# Patient Record
Sex: MALE | Race: WHITE | Employment: OTHER | URBAN - METROPOLITAN AREA
[De-identification: names, ages, dates, MRNs, and addresses within clinical notes are randomized per-mention and may not be internally consistent; named-entity substitution may affect disease eponyms.]

---

## 2018-10-23 PROCEDURE — 36415 COLL VENOUS BLD VENIPUNCTURE: CPT | Performed by: INTERNAL MEDICINE

## 2018-10-23 PROCEDURE — 86803 HEPATITIS C AB TEST: CPT | Performed by: INTERNAL MEDICINE

## 2019-05-30 PROBLEM — F41.9 ANXIETY: Status: ACTIVE | Noted: 2019-05-30

## 2020-04-28 PROBLEM — N18.30 CHRONIC RENAL IMPAIRMENT, STAGE 3 (MODERATE) (HCC): Status: ACTIVE | Noted: 2020-04-28

## 2022-05-25 ENCOUNTER — LAB ENCOUNTER (OUTPATIENT)
Dept: LAB | Age: 72
End: 2022-05-25
Attending: SURGERY
Payer: MEDICARE

## 2022-05-25 ENCOUNTER — NURSE ONLY (OUTPATIENT)
Dept: LAB | Age: 72
End: 2022-05-25
Payer: MEDICARE

## 2022-05-25 ENCOUNTER — ANESTHESIA EVENT (OUTPATIENT)
Dept: SURGERY | Facility: HOSPITAL | Age: 72
End: 2022-05-25
Payer: MEDICARE

## 2022-05-25 DIAGNOSIS — Z01.812 ENCOUNTER FOR PREOPERATIVE SCREENING LABORATORY TESTING FOR COVID-19 VIRUS: ICD-10-CM

## 2022-05-25 DIAGNOSIS — Z20.822 ENCOUNTER FOR PREOPERATIVE SCREENING LABORATORY TESTING FOR COVID-19 VIRUS: ICD-10-CM

## 2022-05-25 DIAGNOSIS — E21.3 HYPERPARATHYROIDISM (HCC): ICD-10-CM

## 2022-05-25 LAB
CHLORIDE SERPL-SCNC: 108 MMOL/L (ref 98–112)
CO2 SERPL-SCNC: 30 MMOL/L (ref 21–32)
POTASSIUM SERPL-SCNC: 4.8 MMOL/L (ref 3.5–5.1)
SARS-COV-2 RNA RESP QL NAA+PROBE: NOT DETECTED
SODIUM SERPL-SCNC: 139 MMOL/L (ref 136–145)

## 2022-05-25 PROCEDURE — 93005 ELECTROCARDIOGRAM TRACING: CPT

## 2022-05-25 PROCEDURE — 80051 ELECTROLYTE PANEL: CPT

## 2022-05-25 PROCEDURE — 36415 COLL VENOUS BLD VENIPUNCTURE: CPT

## 2022-05-27 ENCOUNTER — ANESTHESIA (OUTPATIENT)
Dept: SURGERY | Facility: HOSPITAL | Age: 72
End: 2022-05-27
Payer: MEDICARE

## 2022-05-27 ENCOUNTER — HOSPITAL ENCOUNTER (OUTPATIENT)
Facility: HOSPITAL | Age: 72
Setting detail: HOSPITAL OUTPATIENT SURGERY
Discharge: HOME | End: 2022-05-27
Attending: SURGERY | Admitting: SURGERY
Payer: MEDICARE

## 2022-05-27 VITALS
HEIGHT: 72 IN | BODY MASS INDEX: 25.6 KG/M2 | HEART RATE: 58 BPM | DIASTOLIC BLOOD PRESSURE: 60 MMHG | RESPIRATION RATE: 16 BRPM | OXYGEN SATURATION: 94 % | SYSTOLIC BLOOD PRESSURE: 115 MMHG | WEIGHT: 189 LBS | TEMPERATURE: 98 F

## 2022-05-27 DIAGNOSIS — Z01.812 ENCOUNTER FOR PREOPERATIVE SCREENING LABORATORY TESTING FOR COVID-19 VIRUS: Primary | ICD-10-CM

## 2022-05-27 DIAGNOSIS — E21.3 HYPERPARATHYROIDISM (HCC): ICD-10-CM

## 2022-05-27 DIAGNOSIS — Z20.822 ENCOUNTER FOR PREOPERATIVE SCREENING LABORATORY TESTING FOR COVID-19 VIRUS: Primary | ICD-10-CM

## 2022-05-27 LAB
INR BLD: 1.07 (ref 0.8–1.2)
PROTHROMBIN TIME: 13.9 SECONDS (ref 11.6–14.8)
PTH-INTACT SERPL-MCNC: 103.2 PG/ML
PTH-INTACT SERPL-MCNC: 135.5 PG/ML
PTH-INTACT SERPL-MCNC: 24.7 PG/ML

## 2022-05-27 PROCEDURE — 85610 PROTHROMBIN TIME: CPT

## 2022-05-27 PROCEDURE — BG43ZZZ ULTRASONOGRAPHY OF PARATHYROID GLANDS: ICD-10-PCS | Performed by: SURGERY

## 2022-05-27 PROCEDURE — 83970 ASSAY OF PARATHORMONE: CPT | Performed by: SURGERY

## 2022-05-27 PROCEDURE — 88305 TISSUE EXAM BY PATHOLOGIST: CPT | Performed by: SURGERY

## 2022-05-27 PROCEDURE — 88331 PATH CONSLTJ SURG 1 BLK 1SPC: CPT | Performed by: SURGERY

## 2022-05-27 PROCEDURE — 0GBL0ZZ EXCISION OF RIGHT SUPERIOR PARATHYROID GLAND, OPEN APPROACH: ICD-10-PCS | Performed by: SURGERY

## 2022-05-27 RX ORDER — DEXAMETHASONE SODIUM PHOSPHATE 4 MG/ML
VIAL (ML) INJECTION AS NEEDED
Status: DISCONTINUED | OUTPATIENT
Start: 2022-05-27 | End: 2022-05-27 | Stop reason: SURG

## 2022-05-27 RX ORDER — ROCURONIUM BROMIDE 10 MG/ML
INJECTION, SOLUTION INTRAVENOUS AS NEEDED
Status: DISCONTINUED | OUTPATIENT
Start: 2022-05-27 | End: 2022-05-27 | Stop reason: SURG

## 2022-05-27 RX ORDER — SODIUM CHLORIDE, SODIUM LACTATE, POTASSIUM CHLORIDE, CALCIUM CHLORIDE 600; 310; 30; 20 MG/100ML; MG/100ML; MG/100ML; MG/100ML
INJECTION, SOLUTION INTRAVENOUS CONTINUOUS
Status: DISCONTINUED | OUTPATIENT
Start: 2022-05-27 | End: 2022-05-27

## 2022-05-27 RX ORDER — LIDOCAINE HYDROCHLORIDE 10 MG/ML
INJECTION, SOLUTION EPIDURAL; INFILTRATION; INTRACAUDAL; PERINEURAL AS NEEDED
Status: DISCONTINUED | OUTPATIENT
Start: 2022-05-27 | End: 2022-05-27 | Stop reason: SURG

## 2022-05-27 RX ORDER — ONDANSETRON 2 MG/ML
4 INJECTION INTRAMUSCULAR; INTRAVENOUS EVERY 6 HOURS PRN
Status: DISCONTINUED | OUTPATIENT
Start: 2022-05-27 | End: 2022-05-27

## 2022-05-27 RX ORDER — NALOXONE HYDROCHLORIDE 0.4 MG/ML
80 INJECTION, SOLUTION INTRAMUSCULAR; INTRAVENOUS; SUBCUTANEOUS AS NEEDED
Status: DISCONTINUED | OUTPATIENT
Start: 2022-05-27 | End: 2022-05-27

## 2022-05-27 RX ORDER — PHENYLEPHRINE HCL 10 MG/ML
VIAL (ML) INJECTION AS NEEDED
Status: DISCONTINUED | OUTPATIENT
Start: 2022-05-27 | End: 2022-05-27 | Stop reason: SURG

## 2022-05-27 RX ORDER — METOCLOPRAMIDE HYDROCHLORIDE 5 MG/ML
10 INJECTION INTRAMUSCULAR; INTRAVENOUS EVERY 8 HOURS PRN
Status: DISCONTINUED | OUTPATIENT
Start: 2022-05-27 | End: 2022-05-27

## 2022-05-27 RX ORDER — HYDROCODONE BITARTRATE AND ACETAMINOPHEN 5; 325 MG/1; MG/1
1 TABLET ORAL ONCE AS NEEDED
Status: DISCONTINUED | OUTPATIENT
Start: 2022-05-27 | End: 2022-05-27

## 2022-05-27 RX ORDER — HYDROMORPHONE HYDROCHLORIDE 1 MG/ML
0.6 INJECTION, SOLUTION INTRAMUSCULAR; INTRAVENOUS; SUBCUTANEOUS EVERY 5 MIN PRN
Status: DISCONTINUED | OUTPATIENT
Start: 2022-05-27 | End: 2022-05-27

## 2022-05-27 RX ORDER — HYDROCODONE BITARTRATE AND ACETAMINOPHEN 5; 325 MG/1; MG/1
2 TABLET ORAL ONCE AS NEEDED
Status: DISCONTINUED | OUTPATIENT
Start: 2022-05-27 | End: 2022-05-27

## 2022-05-27 RX ORDER — ACETAMINOPHEN 500 MG
1000 TABLET ORAL ONCE AS NEEDED
Status: DISCONTINUED | OUTPATIENT
Start: 2022-05-27 | End: 2022-05-27

## 2022-05-27 RX ORDER — HYDROMORPHONE HYDROCHLORIDE 1 MG/ML
0.4 INJECTION, SOLUTION INTRAMUSCULAR; INTRAVENOUS; SUBCUTANEOUS EVERY 5 MIN PRN
Status: DISCONTINUED | OUTPATIENT
Start: 2022-05-27 | End: 2022-05-27

## 2022-05-27 RX ORDER — BUPIVACAINE HYDROCHLORIDE 5 MG/ML
INJECTION, SOLUTION EPIDURAL; INTRACAUDAL AS NEEDED
Status: DISCONTINUED | OUTPATIENT
Start: 2022-05-27 | End: 2022-05-27 | Stop reason: HOSPADM

## 2022-05-27 RX ORDER — HYDROCODONE BITARTRATE AND ACETAMINOPHEN 5; 325 MG/1; MG/1
1-2 TABLET ORAL EVERY 4 HOURS PRN
Qty: 10 TABLET | Refills: 0 | Status: SHIPPED | OUTPATIENT
Start: 2022-05-27

## 2022-05-27 RX ORDER — METOPROLOL TARTRATE 5 MG/5ML
2.5 INJECTION INTRAVENOUS ONCE
Status: DISCONTINUED | OUTPATIENT
Start: 2022-05-27 | End: 2022-05-27

## 2022-05-27 RX ORDER — HYDROMORPHONE HYDROCHLORIDE 1 MG/ML
0.2 INJECTION, SOLUTION INTRAMUSCULAR; INTRAVENOUS; SUBCUTANEOUS EVERY 5 MIN PRN
Status: DISCONTINUED | OUTPATIENT
Start: 2022-05-27 | End: 2022-05-27

## 2022-05-27 RX ORDER — ACETAMINOPHEN 500 MG
1000 TABLET ORAL ONCE
Status: DISCONTINUED | OUTPATIENT
Start: 2022-05-27 | End: 2022-05-27 | Stop reason: HOSPADM

## 2022-05-27 RX ORDER — ENOXAPARIN SODIUM 100 MG/ML
INJECTION SUBCUTANEOUS
COMMUNITY
Start: 2022-05-06

## 2022-05-27 RX ORDER — NEOSTIGMINE METHYLSULFATE 1 MG/ML
INJECTION INTRAVENOUS AS NEEDED
Status: DISCONTINUED | OUTPATIENT
Start: 2022-05-27 | End: 2022-05-27 | Stop reason: SURG

## 2022-05-27 RX ORDER — ONDANSETRON 2 MG/ML
INJECTION INTRAMUSCULAR; INTRAVENOUS AS NEEDED
Status: DISCONTINUED | OUTPATIENT
Start: 2022-05-27 | End: 2022-05-27 | Stop reason: SURG

## 2022-05-27 RX ORDER — MEPERIDINE HYDROCHLORIDE 25 MG/ML
12.5 INJECTION INTRAMUSCULAR; INTRAVENOUS; SUBCUTANEOUS AS NEEDED
Status: DISCONTINUED | OUTPATIENT
Start: 2022-05-27 | End: 2022-05-27

## 2022-05-27 RX ORDER — GLYCOPYRROLATE 0.2 MG/ML
INJECTION, SOLUTION INTRAMUSCULAR; INTRAVENOUS AS NEEDED
Status: DISCONTINUED | OUTPATIENT
Start: 2022-05-27 | End: 2022-05-27 | Stop reason: SURG

## 2022-05-27 RX ORDER — MIDAZOLAM HYDROCHLORIDE 1 MG/ML
1 INJECTION INTRAMUSCULAR; INTRAVENOUS EVERY 5 MIN PRN
Status: DISCONTINUED | OUTPATIENT
Start: 2022-05-27 | End: 2022-05-27

## 2022-05-27 RX ORDER — EPHEDRINE SULFATE 50 MG/ML
INJECTION INTRAVENOUS AS NEEDED
Status: DISCONTINUED | OUTPATIENT
Start: 2022-05-27 | End: 2022-05-27 | Stop reason: SURG

## 2022-05-27 RX ADMIN — PHENYLEPHRINE HCL 100 MCG: 10 MG/ML VIAL (ML) INJECTION at 08:26:00

## 2022-05-27 RX ADMIN — PHENYLEPHRINE HCL 100 MCG: 10 MG/ML VIAL (ML) INJECTION at 09:03:00

## 2022-05-27 RX ADMIN — LIDOCAINE HYDROCHLORIDE 50 MG: 10 INJECTION, SOLUTION EPIDURAL; INFILTRATION; INTRACAUDAL; PERINEURAL at 08:17:00

## 2022-05-27 RX ADMIN — DEXAMETHASONE SODIUM PHOSPHATE 8 MG: 4 MG/ML VIAL (ML) INJECTION at 08:31:00

## 2022-05-27 RX ADMIN — EPHEDRINE SULFATE 10 MG: 50 INJECTION INTRAVENOUS at 08:29:00

## 2022-05-27 RX ADMIN — SODIUM CHLORIDE, SODIUM LACTATE, POTASSIUM CHLORIDE, CALCIUM CHLORIDE: 600; 310; 30; 20 INJECTION, SOLUTION INTRAVENOUS at 09:37:00

## 2022-05-27 RX ADMIN — ONDANSETRON 4 MG: 2 INJECTION INTRAMUSCULAR; INTRAVENOUS at 08:31:00

## 2022-05-27 RX ADMIN — ROCURONIUM BROMIDE 40 MG: 10 INJECTION, SOLUTION INTRAVENOUS at 08:20:00

## 2022-05-27 RX ADMIN — PHENYLEPHRINE HCL 100 MCG: 10 MG/ML VIAL (ML) INJECTION at 08:22:00

## 2022-05-27 RX ADMIN — GLYCOPYRROLATE 0.6 MG: 0.2 INJECTION, SOLUTION INTRAMUSCULAR; INTRAVENOUS at 09:25:00

## 2022-05-27 RX ADMIN — EPHEDRINE SULFATE 10 MG: 50 INJECTION INTRAVENOUS at 08:27:00

## 2022-05-27 RX ADMIN — NEOSTIGMINE METHYLSULFATE 3 MG: 1 INJECTION INTRAVENOUS at 09:25:00

## 2022-05-27 RX ADMIN — SODIUM CHLORIDE, SODIUM LACTATE, POTASSIUM CHLORIDE, CALCIUM CHLORIDE: 600; 310; 30; 20 INJECTION, SOLUTION INTRAVENOUS at 08:17:00

## 2022-05-27 NOTE — BRIEF OP NOTE
Pre-Operative Diagnosis: HYPER PARATHYROIDISM     Post-Operative Diagnosis: HYPER PARATHYROIDISM      Procedure Performed:   PARATHYROIDECTOMY WITH INTRAOPERATIVE ULTRASOUND , INTACT PARATHORMONE ASSAY.     Surgeon(s) and Role:     * Chacha Purcell MD - Primary    Assistant(s):  Surgical Assistant.: Zoe Villarreal CSA     Surgical Findings: right posterior - hypercellular; 82 % drop in intact PTH     Component      Latest Ref Rng & Units 5/27/2022 5/27/2022 5/27/2022           8:56 AM  8:43 AM  8:28 AM   PTH INTACT      pg/mL 24.7 103.2 135.5     Specimen: parathyroid gland     Estimated Blood Loss: Blood Output: 2 mL (5/27/2022  9:14 AM)    Dictation Number:  Elif Kelly MD  5/27/2022  9:38 AM

## 2022-05-27 NOTE — ANESTHESIA PROCEDURE NOTES
Airway  Date/Time: 5/27/2022 8:20 AM  Urgency: elective    Airway not difficult    General Information and Staff    Patient location during procedure: OR  Anesthesiologist: Basil Carver MD  Performed: anesthesiologist     Indications and Patient Condition  Indications for airway management: anesthesia  Sedation level: deep  Preoxygenated: yes  Patient position: sniffing  Mask difficulty assessment: 1 - vent by mask  Planned trial extubation    Final Airway Details  Final airway type: endotracheal airway      Successful airway: ETT  Cuffed: yes   Successful intubation technique: direct laryngoscopy  Endotracheal tube insertion site: oral  Blade: Antonieta  Blade size: #3  ETT size (mm): 7.5    Cormack-Lehane Classification: grade I - full view of glottis  Placement verified by: chest auscultation and capnometry   Measured from: lips  Number of attempts at approach: 1

## 2022-05-27 NOTE — OR NURSING
Instructions given. Able to swallow without difficulty. Ate applesauce. Script given. Awake and alert, denies nausea or dizziness.

## 2022-05-28 NOTE — OPERATIVE REPORT
[facility]    PATIENT'S NAME: Merlin Kline   ATTENDING PHYSICIAN: Rosi Guerrero M.D. OPERATING PHYSICIAN: Rosi Guerrero M.D. PATIENT ACCOUNT#:   [de-identified]    LOCATION:  [facility] 8 EDWP 10  MEDICAL RECORD #:   [id number]       YOB: 1950  ADMISSION DATE:       05/27/2022      OPERATION DATE:  05/27/2022    OPERATIVE REPORT      PREOPERATIVE DIAGNOSIS:  Hyperparathyroidism. POSTOPERATIVE DIAGNOSIS:  Hyperparathyroidism. PROCEDURE:  Parathyroidectomy with intraoperative ultrasound, intact parathyroid hormone assay, and intraoperative frozen section. ASSISTANT:  RAYNE Vance    ANESTHESIA:  General.    ESTIMATED BLOOD LOSS:  2 mL. SPECIMEN:  Parathyroid gland. DISPOSITION:  To PACU. COMPLICATIONS:  None. INDICATIONS:  Patient is a 67-year-old male who presented to the office with a diagnosis of hyperparathyroidism. He noticed frequent urination and also had a kidney stone that he needed lithotripsy. He also had some tiredness, but no other symptoms. His highest calcium level was 10.8 with the highest PTH of 99.6. Ultrasound was done in the office and able to visualize the right posterior parathyroid gland. So based on this, discussed with the patient undergoing a parathyroidectomy. The risks and benefits of surgery were discussed, and patient agreed for the procedure, signed the informed consent. FINDINGS:  Right posterior parathyroid gland hypercellular on frozen section. PTH levels 135.5, 103.2, 24.7, and an 82% drop in the intact PTH level. OPERATIVE TECHNIQUE:  Patient was brought to the operating room, was placed in supine position on the operating table. Lower extremity SCD boots were placed. After IV sedation and endotracheal intubation, the patient's arms tucked at side and a roll was placed under shoulder blade. Neck was slightly hyperextended.   Ultrasound was done and again was able to visualize the right parathyroid gland.  After the area was prepped into a sterile field, lower Kocher neck incision was made with a 15-blade knife. Electric Bovie cautery was used to separate the subcutaneous tissue, and superior and inferior flaps raised. Deep cervical fascia was identified. Midline incision was made and then, the right sternum was carefully dissected from the thyroid capsule. As the thyroid gland was medially rotated posterior to that, the parathyroid gland was identified. It was carefully dissected. Blood vessel was isolated and ligated using a 3-0 silk tie. Removed gland was sent down to Pathology for evaluation and then, 3 blood samples obtained pre-incision, pre-excision, and 10 minutes post excision of the gland, and there was an 82% drop in the intact PTH level. Then, the area was inspected. There was no evidence of any bleeding. Good hemostasis noted. A piece of Surgicel laid. The frozen section came back as hypercellular parathyroid gland. Afterward, 3-0 Vicryl was used to close the deep cervical and dermal layer. Local anesthetic was injected into the surrounding tissue and 4-0 Monocryl for the skin placing a subcuticular stitch. Incision was then covered with Steri-Strips, 4 x 4, and tape. The patient tolerated the procedure well, was extubated in the operating room, transferred to PACU in stable condition. At the end of the case, needle, instrument, and sponge counts were all correct. The surgical assistant was medically necessary to do the entire procedure, to position the patient and retract the wound with instruments, and then assist in closing and transferring patient out of the operating room.     Dictated By Ariel Tucker M.D.  d: 05/27/2022 09:43:09  t: 05/27/2022 20:35:37  [facility] [id number]  ELMO/

## 2023-03-21 ENCOUNTER — WALK IN (OUTPATIENT)
Dept: URGENT CARE | Age: 73
End: 2023-03-21
Attending: FAMILY MEDICINE

## 2023-03-21 VITALS
TEMPERATURE: 97.7 F | HEART RATE: 69 BPM | RESPIRATION RATE: 16 BRPM | OXYGEN SATURATION: 96 % | SYSTOLIC BLOOD PRESSURE: 110 MMHG | DIASTOLIC BLOOD PRESSURE: 69 MMHG | WEIGHT: 190 LBS

## 2023-03-21 DIAGNOSIS — J34.89 SINUS PAIN: Primary | ICD-10-CM

## 2023-03-21 DIAGNOSIS — J06.9 ACUTE URI: ICD-10-CM

## 2023-03-21 DIAGNOSIS — J02.9 SORE THROAT: ICD-10-CM

## 2023-03-21 DIAGNOSIS — R09.81 NASAL CONGESTION: ICD-10-CM

## 2023-03-21 LAB
S PYO DNA THROAT QL NAA+PROBE: NOT DETECTED
TEST LOT EXPIRATION DATE: NORMAL
TEST LOT NUMBER: NORMAL

## 2023-03-21 PROCEDURE — 87651 STREP A DNA AMP PROBE: CPT | Performed by: FAMILY MEDICINE

## 2023-03-21 PROCEDURE — 99202 OFFICE O/P NEW SF 15 MIN: CPT

## 2023-03-21 RX ORDER — ATORVASTATIN CALCIUM 40 MG/1
1 TABLET, FILM COATED ORAL DAILY
COMMUNITY
Start: 2023-03-03

## 2023-03-21 RX ORDER — SERTRALINE HYDROCHLORIDE 100 MG/1
100 TABLET, FILM COATED ORAL DAILY
COMMUNITY

## 2023-03-21 RX ORDER — METOPROLOL SUCCINATE 200 MG/1
1 TABLET, EXTENDED RELEASE ORAL DAILY
COMMUNITY
Start: 2022-12-14

## 2023-03-21 RX ORDER — AZITHROMYCIN 250 MG/1
TABLET, FILM COATED ORAL
Qty: 6 TABLET | Refills: 0 | Status: SHIPPED | OUTPATIENT
Start: 2023-03-21

## 2023-03-21 RX ORDER — TAMSULOSIN HYDROCHLORIDE 0.4 MG/1
CAPSULE ORAL DAILY
COMMUNITY

## 2023-03-21 RX ORDER — POTASSIUM CITRATE 15 MEQ/1
15 TABLET, EXTENDED RELEASE ORAL 2 TIMES DAILY
COMMUNITY
Start: 2023-02-20

## 2023-03-21 ASSESSMENT — PAIN SCALES - GENERAL
PAINLEVEL: 0
PAINLEVEL_OUTOF10: 0

## 2023-03-22 ASSESSMENT — ENCOUNTER SYMPTOMS
AGITATION: 0
ADENOPATHY: 0
SORE THROAT: 1
DIARRHEA: 0
FEVER: 0
SINUS PRESSURE: 1
VOMITING: 0
EYE REDNESS: 0
SHORTNESS OF BREATH: 0
COUGH: 0
DIZZINESS: 0
ABDOMINAL PAIN: 0
WEAKNESS: 0
NAUSEA: 0

## (undated) DEVICE — SUT VICRYL 3-0 SH J416H

## (undated) DEVICE — ABSORBABLE HEMOSTAT (OXIDIZED REGENERATED CELLULOSE, U.S.P.): Brand: SURGICEL

## (undated) DEVICE — SUT MONOCRYL 4-0 PS-2 Y496G

## (undated) DEVICE — SLEEVE KENDALL SCD EXPRESS MED

## (undated) DEVICE — 3M(TM) MICROPORE TAPE DISPENSER 1535-2: Brand: 3M™ MICROPORE™

## (undated) DEVICE — SUT SILK 3-0 A184H

## (undated) DEVICE — THYROID: Brand: MEDLINE INDUSTRIES, INC.

## (undated) DEVICE — STERILE SYNTHETIC POLYISOPRENE POWDER-FREE SURGICAL GLOVES WITH HYDROGEL COATING: Brand: PROTEXIS

## (undated) DEVICE — SOLUTION  .9 1000ML BTL

## (undated) DEVICE — 3M™ STERI-STRIP™ REINFORCED ADHESIVE SKIN CLOSURES, R1547, 1/2 IN X 4 IN (12 MM X 100 MM), 6 STRIPS/ENVELOPE: Brand: 3M™ STERI-STRIP™